# Patient Record
(demographics unavailable — no encounter records)

---

## 2025-01-21 NOTE — PHYSICAL EXAM
[de-identified] : s/p thyroidectomy, no palpable lesions or adenopathy [Midline] : trachea located in midline position [Normal] : no rashes

## 2025-01-21 NOTE — HISTORY OF PRESENT ILLNESS
[de-identified] : 1/21/2025 70F here complaining of throat tightness and fullness in her neck. She had a parathyroid surgery and since that time she feels tightness right above her right clavicle, pressure in the throat when swallowing, and fullness in the neck. She was diagnosed with acid reflux which she was told causes these sensations. She can swallow solids without food getting stuck but swallowing has been difficult for her for years. These issues have been going on for 2 years. She is in the process of finding a GI doctor, she has not had an endoscopy yet. She did have a previous MBS.

## 2025-01-21 NOTE — PROCEDURE
[de-identified] : Procedure performed: Fiberoptic Laryngeal Endoscopy - Diagnostic After informed verbal consent topical neosynephrine and lidocaine were applied, the fiberoptic nasal endoscope was passed via the R/L nasal cavity without incident. The patient tolerated the procedure quite well. The nasal cavity was normal without evidence of masses, polyps, lesions or drainage. The nasal septum and turbinates are within normal limits. Nasopharynx was normal without lesions or masses. Eustachian tube orifice normal bilaterals. Oropharynx and Hypopharyngeal mucosa are within normal limits without lesions masses, ulcerations or concerning findings. True and false vocal folds are within normal limits, normal sensory and motor examination. The base of tongue, vallecula, epiglottis are within normal limits without evidence of lesions or abnormalities.  Positive Findings: Normal exam today, no obvious reason for burping or neck tightness and swallowing issues.